# Patient Record
(demographics unavailable — no encounter records)

---

## 2024-10-24 NOTE — PLAN
[FreeTextEntry1] : TAUS: +Sac like structure within the uterus TVUS: +GS, +YS  ASSESSMENT   Patients is a 18yo  at 6 weeks by US. who presents for medication  for pregnancy termination.   Options Counseling: The patient was counseled about her pregnancy options of parenthood, adoption and . She expressed her sure decision for pregnancy termination.   The patient was counseled on medication vs procedural management and wishes to proceed with medication termination of pregnancy.    Risk of medication , including but not limited to: infection, retained products of conception, continuing pregnancy, hemorrhage, need for surgical  or D&C for incomplete  (~5%), potential for teratogenesis in this pregnancy if treatment unsuccessful, blood transfusion, need for further surgery. Discussed side effects, warning symptoms and pain management. Patient agreement forms were signed after discussion of risks and benefits of all management options.  Extensive bleeding and infection precautions were reviewed and written instructions were given to her.  Emergency contact information was provided.   PLAN   #Medication  protocol reviewed in detail.   Mifepristone 200 mg (see note) was administered orally in the clinic today after counseling and review of consent forms.  They were given prescriptions for misoprostol 800 mcg, which they will use buccally on 10/24 at 5pm   They were given prescriptions for ibuprofen 800 mg for pain as well as promethazine for nausea.  They were advised to take a dose of promethazine with their first dose of ibuprofen (before or at the time of taking misoprostol) to potentially help with pain.   #STI screening: Patient was offered screening for sexually transmitted infections, accepts GC/CT/Trich today. Patient declined serology stating she completes this with primary Gyn    #Contraception and Future Pregnancy Planning For contraception, they plan to discuss IUD at next visit after reviewing educational pamphlets  #Follow up: 1-2 weeks for in person visit, ultrasound

## 2024-10-24 NOTE — COUNSELING
[Contraception/ Emergency Contraception/ Safe Sexual Practices] : contraception, emergency contraception, safe sexual practices [STD (testing, results, tx)] : STD (testing, results, tx) [Medication Management] : medication management [Pre/Post Op Instructions] : pre/post op instructions

## 2024-10-24 NOTE — END OF VISIT
[FreeTextEntry3] :  I spent 45 minutes caring for this patient, including time spent before the visit reviewing the chart, time spent during the visit, and time spent after the visit on documentation, excluding all procedures including ultrasounds.  [Time Spent: ___ minutes] : I have spent [unfilled] minutes of time on the encounter which excludes teaching and separately reported services.

## 2024-10-24 NOTE — HISTORY OF PRESENT ILLNESS
[FreeTextEntry1] : REASON FOR VISIT: Termination of Pregnancy   HISTORY OF PRESENT ILLNESS   Patient is a 18yo  who presents for initial consultation for pregnancy termination.   Duration: estimated LMP   Pt has not had US    Patient reports they care currently experiencing the following pregnancy symptoms: breast soreness   The patient has told friends and partner about the pregnancy and  decision and has good support.  The patient is firm in their decision to proceed with termination. No one has pressured them into this decision.    __________________________________________________________________   MEDICATION  ELIGIBILITY SCREEN   Requirements: (Any "no" answers means not eligible)    IUP with gestational age < 77 days (11 weeks): YES  Willing to undergo surgical  if medical  fails:YES  Has an adult support person available after misoprostol administration:YES  Has access to a phone at all times:YES  Able and agrees to follow up plan:YES    Exclusions: (Any "yes" answer means may not be eligible)   Known allergy to mifepristone/misoprostol: NO Current anticoagulant therapy:NO Personal history of clotting disorder:NO Current use of oral corticosteroids:NO Chronic adrenal failure:NO Personal history of porphyria:NO IUD in place:NO Anemia (hemoglobin = 9.5 g/dL) or symptoms/risk factors for anemia:NO Heart disease with impaired function requiring medications:NO Known large fibroid uterus:NO Other serious medical problem:NO __________________________________________________________________   OBSTETRIC HISTORY: G1 History of Prior Transfusions: No     GYNECOLOGIC HISTORY Menses: Regular, moderate flow Cramps: Mild History of STIs?: No History of fibroids?: No History of Abnormal paps?: No Primary Gyn for routine HCM: Dr. Monzon    CONTRACEPTIVE HISTORY Prior methods used: OCP's    MEDICAL HISTORY:  Lupus- does not know antibody status. used to take hydroxychloroquine  SURGICAL HISTORY: No   MEDICATIONS: OCP's - Aurovela  ALLERGIES: Penicillin - reaction as infant      FAMILY HISTORY: Denies VTE, cancers or other PMH      SOCIAL HISTORY: Patient lives with : mother and grandmother Employment:  No  History of sexual or physical trauma or intimate partner violence? No Feels safe at home? : Yes       Reproductive life goals and contraception: We discussed reproductive life planning:  The patient wishes to delay pregnancy for several years. The things that are most important to the patient about a method include: efficacy, does not interfere with Lupus   If the patient got pregnant on a chosen method, they would:be OK with this     We therefore discussed all contraceptive options but focused our discussion on: The patient does not want to discuss contraception today, wishes to discuss IUD options at next visit

## 2024-10-24 NOTE — PHYSICAL EXAM
[Appropriately responsive] : appropriately responsive [Alert] : alert [No Acute Distress] : no acute distress [Soft] : soft [Non-tender] : non-tender [No Lesions] : no lesions [Oriented x3] : oriented x3 [Labia Majora] : normal [Normal] : normal [FreeTextEntry4] : Vaginitis swab obtained

## 2024-11-07 NOTE — PLAN
[FreeTextEntry1] : Conneret:   Patient is a 26yo F presenting for f/u after medical . Patient describes feeling well and is no longer experiencing symptoms of bleeding or cramping. Patient denies anymore breast tenderness as she had during pregnancy. Patient was seeking birth control options counseling after discussion of symptoms post medical .  Birth control options counseling: The patient was seeking to leave with a new method of birth control. She was previously on OCPs and wished for an option that would not need daily or weekly upkeep. Patient decided on the Nexplanon implant.   Plan:  #medication  follow up - Patient is now s/p complete medication   #contraception - Patient received Nexplanon implant in L arm   #STI Screening - completed previously at least visit - negative testing  #Routine Healthcare maintenance - Patient was encouraged to make appointment with primary Gyn for routine HCM
2019 novel coronavirus disease (COVID-19)

## 2024-11-07 NOTE — HISTORY OF PRESENT ILLNESS
[FreeTextEntry1] : Patient is presenting today after medication  follow up. She started the process at 5 week gestation with Mifepristone on the , and took Misoprostol on . She endorses cramping that she compared to period cramps. Patient endorses bleeding that was expected after medication, soaking pads every 2 hours on the first and second day. She experienced one larger clot and then several smaller clots initially. Patient said that she took ibuprofen to control cramping. Patient says that her bleeding has now resolved as of 2-3 days ago. She is feeling good, with no pain or discomfort.